# Patient Record
Sex: FEMALE | Race: AMERICAN INDIAN OR ALASKA NATIVE
[De-identification: names, ages, dates, MRNs, and addresses within clinical notes are randomized per-mention and may not be internally consistent; named-entity substitution may affect disease eponyms.]

---

## 2017-10-18 ENCOUNTER — HOSPITAL ENCOUNTER (EMERGENCY)
Dept: HOSPITAL 8 - ED | Age: 37
Discharge: HOME | End: 2017-10-18
Payer: MEDICAID

## 2017-10-18 VITALS — SYSTOLIC BLOOD PRESSURE: 142 MMHG | DIASTOLIC BLOOD PRESSURE: 86 MMHG

## 2017-10-18 VITALS — HEIGHT: 67 IN | WEIGHT: 293 LBS | BODY MASS INDEX: 45.99 KG/M2

## 2017-10-18 DIAGNOSIS — L02.414: ICD-10-CM

## 2017-10-18 DIAGNOSIS — L03.114: Primary | ICD-10-CM

## 2017-10-18 DIAGNOSIS — F15.10: ICD-10-CM

## 2017-10-18 PROCEDURE — 10060 I&D ABSCESS SIMPLE/SINGLE: CPT

## 2017-10-18 PROCEDURE — 99283 EMERGENCY DEPT VISIT LOW MDM: CPT

## 2020-03-23 ENCOUNTER — HOSPITAL ENCOUNTER (EMERGENCY)
Dept: HOSPITAL 8 - ED | Age: 40
Discharge: HOME | End: 2020-03-23
Payer: MEDICAID

## 2020-03-23 VITALS — WEIGHT: 293 LBS | HEIGHT: 67 IN | BODY MASS INDEX: 45.99 KG/M2

## 2020-03-23 VITALS — DIASTOLIC BLOOD PRESSURE: 103 MMHG | SYSTOLIC BLOOD PRESSURE: 157 MMHG

## 2020-03-23 DIAGNOSIS — K80.20: Primary | ICD-10-CM

## 2020-03-23 DIAGNOSIS — Z87.891: ICD-10-CM

## 2020-03-23 DIAGNOSIS — I10: ICD-10-CM

## 2020-03-23 LAB
ALBUMIN SERPL-MCNC: 2.9 G/DL (ref 3.4–5)
ALP SERPL-CCNC: 69 U/L (ref 45–117)
ALT SERPL-CCNC: 23 U/L (ref 12–78)
ANION GAP SERPL CALC-SCNC: 6 MMOL/L (ref 5–15)
BASOPHILS # BLD AUTO: 0.07 X10^3/UL (ref 0–0.1)
BASOPHILS NFR BLD AUTO: 1 % (ref 0–1)
BILIRUB SERPL-MCNC: 0.2 MG/DL (ref 0.2–1)
CALCIUM SERPL-MCNC: 8.5 MG/DL (ref 8.5–10.1)
CHLORIDE SERPL-SCNC: 106 MMOL/L (ref 98–107)
CREAT SERPL-MCNC: 0.55 MG/DL (ref 0.55–1.02)
EOSINOPHIL # BLD AUTO: 0.15 X10^3/UL (ref 0–0.4)
EOSINOPHIL NFR BLD AUTO: 1 % (ref 1–7)
ERYTHROCYTE [DISTWIDTH] IN BLOOD BY AUTOMATED COUNT: 14.7 % (ref 9.6–15.2)
LYMPHOCYTES # BLD AUTO: 1.94 X10^3/UL (ref 1–3.4)
LYMPHOCYTES NFR BLD AUTO: 18 % (ref 22–44)
MCH RBC QN AUTO: 27.9 PG (ref 27–34.8)
MCHC RBC AUTO-ENTMCNC: 32.9 G/DL (ref 32.4–35.8)
MCV RBC AUTO: 84.8 FL (ref 80–100)
MD: NO
MONOCYTES # BLD AUTO: 0.2 X10^3/UL (ref 0.2–0.8)
MONOCYTES NFR BLD AUTO: 2 % (ref 2–9)
NEUTROPHILS # BLD AUTO: 8.74 X10^3/UL (ref 1.8–6.8)
NEUTROPHILS NFR BLD AUTO: 79 % (ref 42–75)
PLATELET # BLD AUTO: 322 X10^3/UL (ref 130–400)
PMV BLD AUTO: 7 FL (ref 7.4–10.4)
PROT SERPL-MCNC: 7.3 G/DL (ref 6.4–8.2)
RBC # BLD AUTO: 5.09 X10^6/UL (ref 3.82–5.3)

## 2020-03-23 PROCEDURE — 83690 ASSAY OF LIPASE: CPT

## 2020-03-23 PROCEDURE — 80053 COMPREHEN METABOLIC PANEL: CPT

## 2020-03-23 PROCEDURE — 96375 TX/PRO/DX INJ NEW DRUG ADDON: CPT

## 2020-03-23 PROCEDURE — 93005 ELECTROCARDIOGRAM TRACING: CPT

## 2020-03-23 PROCEDURE — 99285 EMERGENCY DEPT VISIT HI MDM: CPT

## 2020-03-23 PROCEDURE — 85025 COMPLETE CBC W/AUTO DIFF WBC: CPT

## 2020-03-23 PROCEDURE — 36415 COLL VENOUS BLD VENIPUNCTURE: CPT

## 2020-03-23 PROCEDURE — 76700 US EXAM ABDOM COMPLETE: CPT

## 2020-03-23 PROCEDURE — 96374 THER/PROPH/DIAG INJ IV PUSH: CPT

## 2020-03-23 NOTE — NUR
PT PRESENTING TO ER FOR EPIGASTRIC PRESSURE RADIATING THROUGH TO HER BACK 
TODAY, PAIN 8/10 PRIOR TO MEDS. CONNECTED TO ALL MONITORING, HTN NOTED. STS 
HASNT TAKEN HTN MEDS IN "QUITE SOME TIME". ORDERS RECEIVED. PT MEDICATED PER 
MAR. TECH AT BEDSIDE FOR EKG. LAB AT BEDSIDE FOR COLLECTION. FAMILY AT BEDSIDE. 
CALL LIGHT WITHIN REACH. AWAITING TESTING AND RESULTS AT THIS TIME

## 2020-03-23 NOTE — NUR
IV PLACED, PT MEDICATED FOR PAIN PER MAR. PT STS PAIN HAS DECREASED TO 4/10 
FROM 8/10. SUP O2 APPLIED. CALL LIGHT WITHIN REACH. AWAITING RECHECK AT THIS 
TIME

## 2023-07-07 ENCOUNTER — HOSPITAL ENCOUNTER (EMERGENCY)
Facility: MEDICAL CENTER | Age: 43
End: 2023-07-08
Attending: EMERGENCY MEDICINE
Payer: COMMERCIAL

## 2023-07-07 VITALS
DIASTOLIC BLOOD PRESSURE: 109 MMHG | BODY MASS INDEX: 45.99 KG/M2 | TEMPERATURE: 97.2 F | HEIGHT: 67 IN | HEART RATE: 98 BPM | OXYGEN SATURATION: 98 % | RESPIRATION RATE: 19 BRPM | SYSTOLIC BLOOD PRESSURE: 179 MMHG | WEIGHT: 293 LBS

## 2023-07-07 DIAGNOSIS — I10 HYPERTENSION, UNSPECIFIED TYPE: Primary | ICD-10-CM

## 2023-07-07 DIAGNOSIS — Z00.8 MEDICAL CLEARANCE FOR INCARCERATION: ICD-10-CM

## 2023-07-07 DIAGNOSIS — F15.10 METHAMPHETAMINE ABUSE (HCC): ICD-10-CM

## 2023-07-07 DIAGNOSIS — Z72.0 TOBACCO ABUSE: ICD-10-CM

## 2023-07-07 DIAGNOSIS — Z91.199 NONCOMPLIANCE: ICD-10-CM

## 2023-07-07 LAB — EKG IMPRESSION: NORMAL

## 2023-07-07 PROCEDURE — 700102 HCHG RX REV CODE 250 W/ 637 OVERRIDE(OP): Performed by: EMERGENCY MEDICINE

## 2023-07-07 PROCEDURE — A9270 NON-COVERED ITEM OR SERVICE: HCPCS | Performed by: EMERGENCY MEDICINE

## 2023-07-07 PROCEDURE — 99283 EMERGENCY DEPT VISIT LOW MDM: CPT

## 2023-07-07 PROCEDURE — 93005 ELECTROCARDIOGRAM TRACING: CPT | Performed by: EMERGENCY MEDICINE

## 2023-07-07 RX ORDER — LISINOPRIL 20 MG/1
20 TABLET ORAL DAILY
Qty: 30 TABLET | Refills: 0 | Status: SHIPPED | OUTPATIENT
Start: 2023-07-07

## 2023-07-07 RX ORDER — LISINOPRIL 20 MG/1
20 TABLET ORAL ONCE
Status: COMPLETED | OUTPATIENT
Start: 2023-07-08 | End: 2023-07-07

## 2023-07-07 RX ADMIN — LISINOPRIL 20 MG: 20 TABLET ORAL at 23:50

## 2023-07-08 NOTE — DISCHARGE INSTRUCTIONS
It is very important that you take your medications as prescribed.  I sent a refill for your lisinopril medication to the pharmacy for you, please pick it up and take as directed.  Please stop smoking cigarettes is bad for your health as well as methamphetamine.  If you have any worsening symptoms or concerns, please return to the ED for further evaluation and treatment.  Thank you for coming in today.    She is medically clear for incarceration.

## 2023-07-08 NOTE — ED TRIAGE NOTES
"Chief Complaint   Patient presents with    Medical Clearance     Pt BIB law enforcement for medical clearance for intermediate. Brought in due to hypertension     Pt has hx of HTN and is supposed to be taking lisinopril for it. Pt states that she has not taken her BP medication in the last 6 months. Pt AOx4, GCS15  BP (!) 201/133   Pulse (!) 102   Temp 35.9 °C (96.6 °F) (Temporal)   Resp 15   Ht 1.702 m (5' 7\")   Wt (!) 145 kg (320 lb)   LMP 06/15/2023 (Approximate)   SpO2 96%   BMI 50.12 kg/m²     "

## 2023-07-08 NOTE — ED NOTES
Patient discharged  per ERP.  Discharge teaching and education discussed with patient. POC discussed.   Patient verbalized understanding of discharge teaching and education. No other questions at this time.     RX x 1 given to patient.       ERP aware of BP at time of discharge. Patient alert and oriented. Patient discharged to PD and is medically cleared. Able to ambulate off unit safely with steady gait.

## 2024-11-18 ENCOUNTER — APPOINTMENT (OUTPATIENT)
Dept: RADIOLOGY | Facility: MEDICAL CENTER | Age: 44
End: 2024-11-18
Attending: EMERGENCY MEDICINE
Payer: MEDICAID

## 2024-11-18 ENCOUNTER — HOSPITAL ENCOUNTER (EMERGENCY)
Facility: MEDICAL CENTER | Age: 44
End: 2024-11-18
Attending: EMERGENCY MEDICINE
Payer: MEDICAID

## 2024-11-18 VITALS
RESPIRATION RATE: 18 BRPM | HEIGHT: 67 IN | HEART RATE: 76 BPM | OXYGEN SATURATION: 96 % | WEIGHT: 283 LBS | TEMPERATURE: 96.5 F | SYSTOLIC BLOOD PRESSURE: 191 MMHG | DIASTOLIC BLOOD PRESSURE: 98 MMHG | BODY MASS INDEX: 44.42 KG/M2

## 2024-11-18 DIAGNOSIS — M25.562 ACUTE PAIN OF LEFT KNEE: ICD-10-CM

## 2024-11-18 PROCEDURE — 700111 HCHG RX REV CODE 636 W/ 250 OVERRIDE (IP): Mod: UD | Performed by: EMERGENCY MEDICINE

## 2024-11-18 PROCEDURE — A9270 NON-COVERED ITEM OR SERVICE: HCPCS | Mod: UD | Performed by: EMERGENCY MEDICINE

## 2024-11-18 PROCEDURE — 302875 HCHG BANDAGE ACE 4 OR 6""

## 2024-11-18 PROCEDURE — 700102 HCHG RX REV CODE 250 W/ 637 OVERRIDE(OP): Mod: UD | Performed by: EMERGENCY MEDICINE

## 2024-11-18 PROCEDURE — 99284 EMERGENCY DEPT VISIT MOD MDM: CPT

## 2024-11-18 PROCEDURE — 73562 X-RAY EXAM OF KNEE 3: CPT | Mod: LT

## 2024-11-18 RX ORDER — NAPROXEN 500 MG/1
500 TABLET ORAL 2 TIMES DAILY WITH MEALS
Qty: 30 TABLET | Refills: 0 | Status: SHIPPED | OUTPATIENT
Start: 2024-11-18

## 2024-11-18 RX ORDER — IBUPROFEN 600 MG/1
600 TABLET, FILM COATED ORAL ONCE
Status: COMPLETED | OUTPATIENT
Start: 2024-11-18 | End: 2024-11-18

## 2024-11-18 RX ADMIN — PREDNISONE 50 MG: 20 TABLET ORAL at 08:46

## 2024-11-18 RX ADMIN — IBUPROFEN 600 MG: 600 TABLET, FILM COATED ORAL at 08:46

## 2024-11-18 ASSESSMENT — PAIN DESCRIPTION - PAIN TYPE: TYPE: ACUTE PAIN

## 2024-11-18 NOTE — ED NOTES
Attempted to use crutches. Patient states she has a walker at home that she was using to get around and was much more comfortable.

## 2024-11-18 NOTE — ED PROVIDER NOTES
ED Provider Note    Primary care provider: Pcp Unobtainable By     CHIEF COMPLAINT  Chief Complaint   Patient presents with    Leg Pain     Around 0300 hrs pt rolled over and attempted to stretch in bed and noticed she was unable to straighten leg. Able to bend without difficulty. Painful to straighten leg. States she feels like it will pop or tear to left knee. Pt placed an icy hot patch to left knee. + CSM all extremities. Left knee swelling with pain distal to knee.        HPI  Patrica Bill is a 44 y.o. female who presents to the Emergency Department for anterior left knee pain.  The patient first noticed it this morning when she got out of bed, she thought maybe she slept on odd.  She denies any direct trauma.  She has never had any injury to this previously.  She notes with no movement it does not hurt.  It only hurts when she tries to fully extend it, flexing the knee is fine.  She has no posterior pain.      External Record Review: Very infrequent visits, the patient was last seen in our Emergency Department in July 2023 for medical clearance for high blood pressure.  Was admitted to this facility in 2010 for polysubstance overdose including methamphetamines, alcohol, benzodiazepines, was intubated.    REVIEW OF SYSTEMS  See HPI.     PAST MEDICAL HISTORY   has a past medical history of Hypertension.    SURGICAL HISTORY  patient denies any surgical history    SOCIAL HISTORY  Social History     Tobacco Use    Smoking status: Every Day     Current packs/day: 0.25     Types: Cigarettes    Smokeless tobacco: Never   Vaping Use    Vaping status: Never Used   Substance Use Topics    Alcohol use: Not Currently    Drug use: Not Currently     Comment: meth      Social History     Substance and Sexual Activity   Drug Use Not Currently    Comment: meth       FAMILY HISTORY  History reviewed. No pertinent family history.    CURRENT MEDICATIONS  Reviewed.  See Encounter Summary.     ALLERGIES  No Known  "Allergies    PHYSICAL EXAM  VITAL SIGNS: BP (!) 191/98   Pulse 76   Temp 35.8 °C (96.5 °F) (Temporal)   Resp 18   Ht 1.702 m (5' 7\")   Wt (!) 128 kg (283 lb)   LMP 10/17/2024   SpO2 96%   BMI 44.32 kg/m²   Constitutional: Awake, alert in no apparent distress.  HENT: Normocephalic, Bilateral external ears normal. Nose normal.   Eyes: Conjunctiva normal, non-icteric, EOMI.    Thorax & Lungs: Easy unlabored respirations, Clear to ascultation bilaterally.  Cardiovascular: Regular rate, Regular rhythm, No murmurs, rubs or gallops. Bilateral pulses symmetrical.   Abdomen: Nondistended.   :    Skin: Visualized skin is  Dry, No erythema, No rash.   Musculoskeletal:   Left knee: Patient keeping in a flexed position with IcyHot patch over the anterior aspect of it.  There is no effusion, no tenderness along the posterior aspect of the knee, lower leg, upper thigh, hamstring, lateral aspect of the knee.  Medial aspect of the knee is normal as well.  The only location of tenderness is on the patellar tendon and inferior patellar region.  With flexion she is not bothered by this, she can extend back to her initial position of roughly 45 degrees of flexion, has more pain when she tries to get to full extension.  Neurologic: Alert, Grossly non-focal.   Psychiatric: Normal affect, Normal mood  Lymphatic:        RADIOLOGY  I have independently interpreted the diagnostic imaging associated with this visit.   My preliminary interpretation is as follows: No acute traumatic injury or effusion    Radiologist interpretation:   DX-KNEE 3 VIEWS LEFT   Final Result      1.  Moderate tricompartmental degenerative changes of the knee without underlying acute fracture or malalignment.          COURSE & MEDICAL DECISION MAKING  Pertinent Labs & Imaging studies reviewed. (See chart for details)    COURSE & MEDICAL DECISION MAKING  Pertinent Labs & Imaging studies reviewed. (See chart for details)    Differential diagnoses include but " are not limited to: Bursitis, arthritis    7:55 AM - Nursing notes reviewed, patient seen and examined at bedside.    Escalation of care considered, and ultimately not performed: Laboratory analysis.      Decision Making:  This is a pleasant 44 y.o. year old female who presents with atraumatic left knee pain, she has good range of motion, cannot fully extend it but certainly no concerns for septic joint at this time, the skin is not warm, no effusion.  X-rays unremarkable.  The location of the pain is over the patella tendon and inferior patella, I suspect this is more prepatellar bursitis, is unusual that it worsens with extension, as typically the anterior aspect of the knee is going to be worsened with anything of flexion exercises.  In any case, does not appear to be emergent, I think that anti-inflammatories, short burst of steroids will get the patient back up on her feet.     The patient was discharged home (see d/c instructions) was told to return immediately for any signs or symptoms listed, or any worsening at all.  The patient verbally agreed to the discharge precautions and follow-up plan which is documented in EPIC.    Discharge Medications:  Discharge Medication List as of 11/18/2024  9:53 AM        START taking these medications    Details   naproxen (NAPROSYN) 500 MG Tab Take 1 Tablet by mouth 2 times a day with meals., Disp-30 Tablet, R-0, Normal             FINAL IMPRESSION  1. Acute pain of left knee

## 2024-11-18 NOTE — ED NOTES
Pt discharged, all appropriate hospital equipment removed (IV, monitor, pulse ox, etc.). Pt left unit via wheelchair for comfort with ED tech to ED lobby for transport home with family. Personal belongings with pt when leaving unit. Pt given discharge instructions prior to leaving unit including where to  prescriptions and when to follow-up; verbalizes understanding. Pt informed to return to ED if symptoms worsen/return or altered status develop. Copy of discharge instructions signed and turned into DC basket and copy sent with pt.

## 2024-11-18 NOTE — ED TRIAGE NOTES
"Chief Complaint   Patient presents with    Leg Pain     Around 0300 hrs pt rolled over and attempted to stretch in bed and noticed she was unable to straighten leg. Able to bend without difficulty. Painful to straighten leg. States she feels like it will pop or tear to left knee. Pt placed an icy hot patch to left knee. + CSM all extremities. Left knee swelling with pain distal to knee.      Pt wheeled form triage.  Pt speaking full sentences, A & O x 4. Respirations equal and unlabored. Pt educated on triage process and to alert staff of any changing conditions. Pt returned to the lobby. Charge RN notified.     BP (!) 189/123   Pulse 75   Temp 35.8 °C (96.5 °F) (Temporal)   Resp 16   Ht 1.702 m (5' 7\")   Wt (!) 128 kg (283 lb)   LMP 10/17/2024   SpO2 97%   BMI 44.32 kg/m²       "